# Patient Record
Sex: FEMALE | Race: WHITE | NOT HISPANIC OR LATINO | Employment: FULL TIME | URBAN - METROPOLITAN AREA
[De-identification: names, ages, dates, MRNs, and addresses within clinical notes are randomized per-mention and may not be internally consistent; named-entity substitution may affect disease eponyms.]

---

## 2019-08-08 ENCOUNTER — TELEPHONE (OUTPATIENT)
Dept: OBSTETRICS AND GYNECOLOGY | Age: 24
End: 2019-08-08

## 2019-08-22 ENCOUNTER — OFFICE VISIT (OUTPATIENT)
Dept: OBSTETRICS AND GYNECOLOGY | Age: 24
End: 2019-08-22

## 2019-08-22 VITALS
DIASTOLIC BLOOD PRESSURE: 78 MMHG | HEIGHT: 64 IN | SYSTOLIC BLOOD PRESSURE: 118 MMHG | BODY MASS INDEX: 23.93 KG/M2 | WEIGHT: 140.2 LBS

## 2019-08-22 DIAGNOSIS — Z97.5 NEXPLANON IN PLACE: ICD-10-CM

## 2019-08-22 DIAGNOSIS — Z12.4 SCREENING FOR MALIGNANT NEOPLASM OF CERVIX: Primary | ICD-10-CM

## 2019-08-22 PROCEDURE — 99385 PREV VISIT NEW AGE 18-39: CPT | Performed by: OBSTETRICS & GYNECOLOGY

## 2019-08-22 NOTE — PROGRESS NOTES
Routine Annual Visit    2019    Patient: Carmen Tejeda          MR#:[id number]      Chief Complaint   Patient presents with   • Gynecologic Exam     New pt. annual. has never had a pap smear done        History of Present Illness    23 y.o. female  who presents for annual exam and to discuss birth control    She [facility] and one replaced, she had instant nerve pain with the Nexplanon, it goes down her medial arm and into her pinky and ring finger.  She still occasionally has this pain if the area of the Nexplanon is manipulated in any way but has gotten better over time.  She also has persistent abnormal brown spotting that is annoying to her.  Is considering getting the Nexplanon out for this reason only, but is concerned about this nerve pain too. Also refuses attempt at nexplanon removal in the office and will request sedation.    Exercises  Sexually active w BF and declines STD screening today  No issues w intercourse  Declines STI testing    Health Maintenance  Gardasil pretty sure she got Gardasil  Last pap none yet  Mammogram NA  Colonoscopy NA  PCP none    No LMP recorded. Patient has had an implant.  Obstetric History:  OB History      Para Term  AB Living    0 0 0 0 0 0    SAB TAB Ectopic Molar Multiple Live Births    0 0 0 0 0 0         Menstrual History:     No LMP recorded. Patient has had an implant.     ________________________________________  There is no problem list on file for this patient.      History reviewed. No pertinent past medical history.    Family History   Problem Relation Age of Onset   • Lupus Mother    • Breast cancer Maternal Grandmother    • Ovarian cancer Maternal Great-Grandmother        Past Surgical History:   Procedure Laterality Date   • EAR TUBES      when she was a baby    • WISDOM TOOTH EXTRACTION         Social History     Tobacco Use   Smoking Status Never Smoker   Smokeless Tobacco Never  "Used       currently has no medications in their medication list.  ________________________________________    Current contraception: nexplanon  History of abnormal Pap smear: no  Family history of Breast, ovarian, uterine, colon, pancreatic cancer: GMA w breast and possible great GMA with ovarian ca  History of abnormal mammogram: NA    The following portions of the patient's history were reviewed and updated as appropriate: allergies, current medications, past family history, past medical history, past social history, past surgical history and problem list.    Review of Systems-complains of review of systems negative except for the nerve pain in her hand and abnormal bleeding    Objective   Physical Exam    /78   Ht 162.6 cm (64\")   Wt 63.6 kg (140 lb 3.2 oz)   Breastfeeding? No Comment: nexplanon   BMI 24.07 kg/m²    BP Readings from Last 3 Encounters:   08/22/19 118/78      Wt Readings from Last 3 Encounters:   08/22/19 63.6 kg (140 lb 3.2 oz)         BMI: Body mass index is 24.07 kg/m².        General:   alert, appears stated age and cooperative    Heart:: regular rate and rhythm, S1, S2 normal, no murmur, click, rub or gallop    Lungs: normal respiratory effort and auscultation    Left arm nexplanon palpable but a little deeply placed in the sulcus groove. When moved right and left there is palpable click with reproduction of tingling in her fourth and fifth digits    Abdomen: soft, non-tender, without masses or organomegaly    Breast: inspection negative, no nipple discharge or bleeding, no masses or nodularity palpable    Urethra and bladder: urethral meatus normal; bladder nontender to palpation;    Vulva: normal, Bartholin's, Urethra, [place]'s normal    Vagina: normal mucosa, normal discharge    Cervix: no lesions and nulliparous appearance    Uterus: normal size or anteverted    Adnexa: normal adnexa and no mass, fullness, tenderness       Assessment:    abnormal annual exam - nexplanon " issue    Plan:    Plan     []  Mammogram request made  [x]  PAP done  []  Labs:   []  GC/Chl/TV  []  DEXA scan   []  Referral for colonoscopy:     Problems Addressed this Visit        Other    Nexplanon in place    Screening for malignant neoplasm of cervix - Primary    Relevant Orders    IGP, Rfx Aptima HPV ASCU        Nexplanon in left arm associated with nerve complaints in the ulnar distribution.  Discussed that for now it is providing her with contraception, will contact  representatives regarding recommendations for removal versus if can continue with the device.  She would desire removal due to her abnormal bleeding.  Discussed other forms of birth control and we most interested in going back on low Loestrin.    Counseling  [x]  Nutrition  [x]  Physical activity/regular exercise   [x]  Healthy weight  []  Injury prevention  []  Smoking cessation  []  Substance misuse/abuse  [x]  Sexual behavior  [x]  STD prevention  [x]  Contraception  []  Dental health  []  Mental health  []  Immunization  [x]  Encouraged KARTHIKEYAN Reyes MD  08/22/2019  12:54 PM

## 2019-08-26 DIAGNOSIS — S54.02XA: Primary | ICD-10-CM

## 2019-08-26 LAB
CONV .: NORMAL
CYTOLOGIST CVX/VAG CYTO: NORMAL
CYTOLOGY CVX/VAG DOC CYTO: NORMAL
CYTOLOGY CVX/VAG DOC THIN PREP: NORMAL
DX ICD CODE: NORMAL
HIV 1 & 2 AB SER-IMP: NORMAL
OTHER STN SPEC: NORMAL
STAT OF ADQ CVX/VAG CYTO-IMP: NORMAL

## 2019-08-26 NOTE — PROGRESS NOTES
Called and discussed the ulnar nerve issue with the deep nexplanon. Discussed with surgeon colleagues and have recommendation for her to see plastics/hand surgeon for removal.  Referral placed to Dr. Hinojosa.  Also she desires to use patch after nexplanon removed, sent to pharmacy and to start 1 week prior to removal.  Follow up after nexplanon removal, does not necessary need the currently scheduled appt in sept.    Rachel Reyes MD  8/26/2019  2:34 PM

## 2019-08-27 ENCOUNTER — TELEPHONE (OUTPATIENT)
Dept: OBSTETRICS AND GYNECOLOGY | Age: 24
End: 2019-08-27

## 2019-08-27 NOTE — TELEPHONE ENCOUNTER
----- Message from Rachel Reyes MD sent at 8/27/2019  8:50 AM EDT -----  Please let her know that her Pap was normal    ----- Message -----  From: Interface, Reflab Results In  Sent: 8/26/2019   4:35 PM  To: Rachel Reyes MD

## 2019-09-19 ENCOUNTER — TELEPHONE (OUTPATIENT)
Dept: OBSTETRICS AND GYNECOLOGY | Age: 24
End: 2019-09-19

## 2019-09-19 NOTE — TELEPHONE ENCOUNTER
Called pt as she had cancelled appt today in follow up of her nexplanon issue.   She saw plastic surgery and they do not believe that her ulnar nerve issue is due to the nexplanon but rather due to different anatomy at her elbow. They did imaging at it does not appear to be involving the ulnar nerve. They recommended leaving it in place and removal at the usually indicated time. Will request records from her visit.    To follow up for annual exam next year or sooner if having any issues.    Rachel Reyes MD  9/19/2019  9:15 AM

## 2021-09-27 ENCOUNTER — TELEPHONE (OUTPATIENT)
Dept: OBSTETRICS AND GYNECOLOGY | Age: 26
End: 2021-09-27

## 2021-09-29 ENCOUNTER — TELEPHONE (OUTPATIENT)
Dept: OBSTETRICS AND GYNECOLOGY | Age: 26
End: 2021-09-29

## 2021-09-29 NOTE — TELEPHONE ENCOUNTER
Attempted to call patient, we need to examine this Nexplanon again.  If it is truly a deep placement then we might consider it but otherwise it truly needs to be removed in the office.    Bottom line, needs an appointment to discuss this further.  Can use new OB slot next week

## 2021-09-29 NOTE — TELEPHONE ENCOUNTER
Pt called wanting nexplanon removed. Pt called two days ago, per last telephone encounter, she does not want it removed in the office. Please advise.

## 2021-10-06 ENCOUNTER — OFFICE VISIT (OUTPATIENT)
Dept: OBSTETRICS AND GYNECOLOGY | Age: 26
End: 2021-10-06

## 2021-10-06 VITALS
HEIGHT: 64 IN | SYSTOLIC BLOOD PRESSURE: 116 MMHG | DIASTOLIC BLOOD PRESSURE: 68 MMHG | WEIGHT: 128.8 LBS | BODY MASS INDEX: 21.99 KG/M2

## 2021-10-06 DIAGNOSIS — F41.9 ANXIETY DUE TO INVASIVE PROCEDURE: Primary | ICD-10-CM

## 2021-10-06 PROCEDURE — 99214 OFFICE O/P EST MOD 30 MIN: CPT | Performed by: OBSTETRICS & GYNECOLOGY

## 2021-10-06 RX ORDER — LEVONORGESTREL AND ETHINYL ESTRADIOL 0.1-0.02MG
1 KIT ORAL DAILY
Qty: 84 TABLET | Refills: 3 | Status: SHIPPED | OUTPATIENT
Start: 2021-10-06 | End: 2022-09-02

## 2021-10-06 RX ORDER — ALPRAZOLAM 1 MG/1
1 TABLET ORAL ONCE
Qty: 1 TABLET | Refills: 0 | Status: SHIPPED | OUTPATIENT
Start: 2021-10-06 | End: 2021-10-06

## 2021-10-06 NOTE — PROGRESS NOTES
"Chief Complaint   Patient presents with   • Gynecologic Exam     Discuss Nexplanon removal      Carmen Tejeda presents to discuss Nexplanon removal.  She had some issues with the last one, feels like a nerve might of been injured.  She saw hand surgeon and they did not feel as though this was the case.  She seems to have some slightly aberrant nerve anatomy in her arm that has led to her tingling and numbness in her fourth and fifth fingers occasionally.  She is very anxious about having the Nexplanon removed    /68   Ht 162.6 cm (64\")   Wt 58.4 kg (128 lb 12.8 oz)   Breastfeeding No   BMI 22.11 kg/m²   Appears well and in no distress  Nexplanon present in left upper arm, and is quite superficial    A/P  Nexplanon expiring  She was interested in sedation for Nexplanon removal, but it is quite superficial and I recommended being awake during this procedure.  Plan to give her an anxiolytic beforehand.    Follow-up for Nexplanon removal, desires to start OCPs and recommended starting a week prior to removal. Sent to pharmacy    Rachel Reyes MD  10/6/2021   15:00 EDT    "

## 2021-10-11 ENCOUNTER — TELEPHONE (OUTPATIENT)
Dept: OBSTETRICS AND GYNECOLOGY | Age: 26
End: 2021-10-11

## 2021-10-11 NOTE — TELEPHONE ENCOUNTER
PT calls stating she started her cycle today and asking if she should still start her BCP she is to take prior to her nexplanon insertion. Please advise

## 2021-10-12 NOTE — TELEPHONE ENCOUNTER
Pt started a menses yesterday. She was told by Dr Reyes to start the pills on 10-13, but now that she has started a menses, when should she start the pills. Should she start on the 13th still or when this period ends?

## 2021-10-20 ENCOUNTER — OFFICE VISIT (OUTPATIENT)
Dept: OBSTETRICS AND GYNECOLOGY | Age: 26
End: 2021-10-20

## 2021-10-20 VITALS
WEIGHT: 125.8 LBS | DIASTOLIC BLOOD PRESSURE: 70 MMHG | BODY MASS INDEX: 21.48 KG/M2 | HEIGHT: 64 IN | SYSTOLIC BLOOD PRESSURE: 110 MMHG

## 2021-10-20 DIAGNOSIS — Z30.46 NEXPLANON REMOVAL: Primary | ICD-10-CM

## 2021-10-20 PROCEDURE — 11982 REMOVE DRUG IMPLANT DEVICE: CPT | Performed by: OBSTETRICS & GYNECOLOGY

## 2021-10-20 RX ORDER — ALPRAZOLAM 1 MG/1
TABLET ORAL
COMMUNITY
Start: 2021-10-12 | End: 2022-09-14

## 2021-10-20 NOTE — PROGRESS NOTES
PROCEDURE NOTE   Nexplanon Removal    Applying Universal Protocol I properly identified the patient and sought her signature with informed consent. Plans for future Contraception were discussed .  The patient chose to use birth control pills after nexplanon removal.    The area was prepped with Betadine,  3 cc's of  local 1% xylocaine with epinephrine was injected sub-cutaneous with a 25 ga needle.  After sufficient time for the anesthetic to work a  #11 SURGICAL knife was used to make a 4 mm incision over the underlying device. Using a hemostat  The device was  successfully removed.  Closure was completed by gauze and stretchwrap.  Instructions for wound care and follow up were discussed.    She tolerated the procedure well. Needs to follow up for annual exam soon.    Rachel Reyes MD  10/20/2021  13:53 EDT

## 2022-09-02 RX ORDER — LEVONORGESTREL AND ETHINYL ESTRADIOL 0.1-0.02MG
KIT ORAL
Qty: 84 TABLET | Refills: 3 | Status: SHIPPED | OUTPATIENT
Start: 2022-09-02 | End: 2022-09-14

## 2022-09-14 ENCOUNTER — OFFICE VISIT (OUTPATIENT)
Dept: OBSTETRICS AND GYNECOLOGY | Age: 27
End: 2022-09-14

## 2022-09-14 VITALS
HEIGHT: 64 IN | WEIGHT: 135 LBS | SYSTOLIC BLOOD PRESSURE: 100 MMHG | BODY MASS INDEX: 23.05 KG/M2 | DIASTOLIC BLOOD PRESSURE: 60 MMHG

## 2022-09-14 DIAGNOSIS — Z12.4 SCREENING FOR CERVICAL CANCER: ICD-10-CM

## 2022-09-14 DIAGNOSIS — Z11.3 SCREEN FOR STD (SEXUALLY TRANSMITTED DISEASE): ICD-10-CM

## 2022-09-14 DIAGNOSIS — Z01.419 WELL WOMAN EXAM WITH ROUTINE GYNECOLOGICAL EXAM: Primary | ICD-10-CM

## 2022-09-14 PROCEDURE — 99395 PREV VISIT EST AGE 18-39: CPT | Performed by: NURSE PRACTITIONER

## 2022-09-14 NOTE — PROGRESS NOTES
Subjective     Chief Complaint   Patient presents with   • Gynecologic Exam     ae, LAST PAP 2019 NEG       History of Present Illness    Carmen Han is a 26 y.o.  who presents for annual exam.    Doing well  Works in real estate  Due for a pap smear  Periods are irregular   She stopped OCP's two weeks ago  She is ok if pregnancy happens but not trying  No other GYN concerns or complaints    Obstetric History:  OB History        0    Para   0    Term   0       0    AB   0    Living   0       SAB   0    IAB   0    Ectopic   0    Molar   0    Multiple   0    Live Births   0               Menstrual History:     Patient's last menstrual period was 2022.         Current contraception: none  History of abnormal Pap smear: no  Received Gardasil immunization: no  Perform regular self breast exam: no   Family history of uterine or ovarian cancer: no  Family History of colon cancer: no  Family history of breast cancer: yes - MGM    Mammogram: not indicated.  Colonoscopy: not indicated.  DEXA: not indicated.    Exercise: moderately active  Calcium/Vitamin D: adequate intake    The following portions of the patient's history were reviewed and updated as appropriate: allergies, current medications, past family history, past medical history, past social history, past surgical history and problem list.    Review of Systems   Constitutional: Negative.    Respiratory: Negative.    Cardiovascular: Negative.    Gastrointestinal: Negative.    Genitourinary: Negative.    Skin: Negative.    Psychiatric/Behavioral: Negative.            Objective   Physical Exam  Constitutional:       General: She is awake.      Appearance: Normal appearance. She is well-developed.   HENT:      Head: Normocephalic and atraumatic.      Nose: Nose normal.   Neck:      Thyroid: No thyroid mass, thyromegaly or thyroid tenderness.   Cardiovascular:      Rate and Rhythm: Normal rate and regular rhythm.      Pulses: Normal  pulses.      Heart sounds: Normal heart sounds.   Pulmonary:      Effort: Pulmonary effort is normal.      Breath sounds: Normal breath sounds.   Chest:   Breasts: Breasts are symmetrical.      Right: Normal. No swelling, bleeding, inverted nipple, mass, nipple discharge, skin change, tenderness or supraclavicular adenopathy.      Left: Normal. No swelling, bleeding, inverted nipple, mass, nipple discharge, skin change, tenderness or supraclavicular adenopathy.       Abdominal:      General: Abdomen is flat. Bowel sounds are normal.      Palpations: Abdomen is soft.      Tenderness: There is no abdominal tenderness.   Genitourinary:     General: Normal vulva.      Labia:         Right: No rash, tenderness, lesion or injury.         Left: No rash, tenderness, lesion or injury.       Urethra: No prolapse, urethral pain, urethral swelling or urethral lesion.      Vagina: Normal. No signs of injury. No vaginal discharge, erythema, tenderness, bleeding, lesions or prolapsed vaginal walls.      Cervix: No discharge, friability, lesion, erythema or cervical bleeding.      Uterus: Normal. Not enlarged, not tender and no uterine prolapse.       Adnexa: Right adnexa normal and left adnexa normal.        Right: No mass, tenderness or fullness.          Left: No mass, tenderness or fullness.        Rectum: Normal. No mass.   Musculoskeletal:      Cervical back: Normal range of motion and neck supple.   Lymphadenopathy:      Upper Body:      Right upper body: No supraclavicular adenopathy.      Left upper body: No supraclavicular adenopathy.   Skin:     General: Skin is warm and dry.   Neurological:      General: No focal deficit present.      Mental Status: She is alert and oriented to person, place, and time.   Psychiatric:         Mood and Affect: Mood normal.         Behavior: Behavior normal. Behavior is cooperative.         Thought Content: Thought content normal.         Judgment: Judgment normal.         /60   Ht  "162.6 cm (64\")   Wt 61.2 kg (135 lb)   LMP 08/31/2022   BMI 23.17 kg/m²     Assessment & Plan   Diagnoses and all orders for this visit:    1. Well woman exam with routine gynecological exam (Primary)    2. Screening for cervical cancer  -     IGP,CtNgTv,rfx Aptima HPV ASCU    3. Screen for STD (sexually transmitted disease)  -     IGP,CtNgTv,rfx Aptima HPV ASCU        All questions answered.  Breast self exam technique reviewed and patient encouraged to perform self-exam monthly.  Discussed healthy lifestyle modifications.  Recommended 30 minutes of aerobic exercise five times per week.  Discussed calcium needs to prevent osteoporosis.    F/u 1 year  Start daily PNV               "

## 2022-09-21 LAB
C TRACH RRNA CVX QL NAA+PROBE: NEGATIVE
CONV .: NORMAL
CYTOLOGIST CVX/VAG CYTO: NORMAL
CYTOLOGY CVX/VAG DOC CYTO: NORMAL
CYTOLOGY CVX/VAG DOC THIN PREP: NORMAL
DX ICD CODE: NORMAL
HIV 1 & 2 AB SER-IMP: NORMAL
Lab: NORMAL
N GONORRHOEA RRNA CVX QL NAA+PROBE: NEGATIVE
OTHER STN SPEC: NORMAL
STAT OF ADQ CVX/VAG CYTO-IMP: NORMAL
T VAGINALIS RRNA SPEC QL NAA+PROBE: NEGATIVE

## 2023-02-14 ENCOUNTER — TELEPHONE (OUTPATIENT)
Dept: OBSTETRICS AND GYNECOLOGY | Age: 28
End: 2023-02-14
Payer: COMMERCIAL

## 2023-02-14 NOTE — TELEPHONE ENCOUNTER
Pt's LMP 12-, Pt is c/o nausea and vomiting and requesting meds be sent to pharmacy. Pt has NOB appt 2-.

## 2023-02-21 ENCOUNTER — OFFICE VISIT (OUTPATIENT)
Dept: OBSTETRICS AND GYNECOLOGY | Age: 28
End: 2023-02-21
Payer: COMMERCIAL

## 2023-02-21 VITALS
SYSTOLIC BLOOD PRESSURE: 112 MMHG | BODY MASS INDEX: 21.51 KG/M2 | WEIGHT: 126 LBS | HEIGHT: 64 IN | DIASTOLIC BLOOD PRESSURE: 64 MMHG

## 2023-02-21 DIAGNOSIS — Z34.90 EARLY STAGE OF PREGNANCY: Primary | ICD-10-CM

## 2023-02-21 PROCEDURE — 99213 OFFICE O/P EST LOW 20 MIN: CPT | Performed by: PHYSICIAN ASSISTANT

## 2023-02-21 RX ORDER — DOXYLAMINE SUCCINATE AND PYRIDOXINE HYDROCHLORIDE 20; 20 MG/1; MG/1
TABLET, EXTENDED RELEASE ORAL
COMMUNITY
End: 2023-02-21 | Stop reason: SDUPTHER

## 2023-02-21 RX ORDER — DOXYLAMINE SUCCINATE AND PYRIDOXINE HYDROCHLORIDE 20; 20 MG/1; MG/1
1 TABLET, EXTENDED RELEASE ORAL 2 TIMES DAILY
Qty: 60 TABLET | Refills: 2 | Status: SHIPPED | OUTPATIENT
Start: 2023-02-21

## 2023-02-21 RX ORDER — LANOLIN ALCOHOL/MO/W.PET/CERES
50 CREAM (GRAM) TOPICAL DAILY
COMMUNITY

## 2023-02-21 NOTE — PROGRESS NOTES
"Subjective     Chief Complaint   Patient presents with   • Possible Pregnancy     pregnancy confirmation LMP 2022, ultrasound done today. C/o a lot of nausea and vomiting.  Occ cramping       Carmen SCOTT is a 27 y.o.  whose LMP is Patient's last menstrual period was 2022 (exact date). presents to establish for new ob    She is newly pregnant  First pregnancy for her and her   Has been dealing with nausea and vomiting  Is on bonjesta now and doing better  I do see a weight loss of 9 lbs since her last visit in September and she does attribute this to the N/V  Is now able to eat and drink    She is taking a PNV    Had an ultrasound today    Pt of Dr Eric logan on her exams    No Additional Complaints Reported    The following portions of the patient's history were reviewed and updated as appropriate:vital signs, allergies, current medications, past social history, past surgical history and problem list      Review of Systems   Genitourinary:positive for early stage of pregnancy   Objective      /64   Ht 162.6 cm (64\")   Wt 57.2 kg (126 lb)   LMP 2022 (Exact Date)   BMI 21.63 kg/m²     Physical Exam    General:   alert, comfortable and no distress   Heart: Not performed today   Lungs: Not performed today.   Breast: Not performed today   Neck: Not performed today   Abdomen: Not performed today   CVA: Not performed today   Pelvis: Not performed today   Extremities: Not performed today   Neurologic: negative   Psychiatric: Normal affect, judgement, and mood       Lab Review   Labs: No data reviewed    Imaging   Ultrasound - Pelvic Vaginal    Intrauterine pregnancy at Unknown  Fetal heart rate: 178  Gestational age for synopsis: 7 wks  Basis for EDC: Ultrasound   Relevant comparison data: No relevant comparison data  ELLY based off u/s is 10/2/2023    Assessment & Plan     ASSESSMENT  1. Early stage of pregnancy          PLAN  1.   Orders Placed This Encounter   Procedures "   • Urine Culture - Urine, Urine, Random Void   • HIV-1 / O / 2 Ag / Antibody 4th Generation   • Hepatitis B Surface Antigen   • Hepatitis C Antibody   • Rubella Antibody, IgG   • RPR, Rfx Qn RPR / Confirm TP   • ABO / Rh   • Antibody Screen   • CBC & Differential       2. Medications prescribed this encounter:        New Medications Ordered This Visit   Medications   • Doxylamine-Pyridoxine ER (Bonjesta) 20-20 MG tablet controlled-release     Sig: Take 1 tablet by mouth 2 (Two) Times a Day.     Dispense:  60 tablet     Refill:  2       3. C/w PNV and bonjesta prn n/v. Labs and urine ordered today. Reviewed new ob folder with pt. HO given on NIPS and carrier testing. Call for any issues, otherwise, f/u for new ob visit in 3 wks    Follow up: 3 week(s)    LELE Velazquez  2/21/2023

## 2023-02-23 LAB
ABO GROUP BLD: NORMAL
BACTERIA UR CULT: NO GROWTH
BACTERIA UR CULT: NORMAL
BASOPHILS # BLD AUTO: 0.1 X10E3/UL (ref 0–0.2)
BASOPHILS NFR BLD AUTO: 1 %
BLD GP AB SCN SERPL QL: NEGATIVE
EOSINOPHIL # BLD AUTO: 0 X10E3/UL (ref 0–0.4)
EOSINOPHIL NFR BLD AUTO: 1 %
ERYTHROCYTE [DISTWIDTH] IN BLOOD BY AUTOMATED COUNT: 12 % (ref 11.7–15.4)
HBV SURFACE AG SERPL QL IA: NEGATIVE
HCT VFR BLD AUTO: 35.5 % (ref 34–46.6)
HCV IGG SERPL QL IA: NON REACTIVE
HGB BLD-MCNC: 12.2 G/DL (ref 11.1–15.9)
HIV 1+2 AB+HIV1 P24 AG SERPL QL IA: NON REACTIVE
IMM GRANULOCYTES # BLD AUTO: 0 X10E3/UL (ref 0–0.1)
IMM GRANULOCYTES NFR BLD AUTO: 0 %
LYMPHOCYTES # BLD AUTO: 1.1 X10E3/UL (ref 0.7–3.1)
LYMPHOCYTES NFR BLD AUTO: 14 %
MCH RBC QN AUTO: 31.1 PG (ref 26.6–33)
MCHC RBC AUTO-ENTMCNC: 34.4 G/DL (ref 31.5–35.7)
MCV RBC AUTO: 91 FL (ref 79–97)
MONOCYTES # BLD AUTO: 0.4 X10E3/UL (ref 0.1–0.9)
MONOCYTES NFR BLD AUTO: 5 %
NEUTROPHILS # BLD AUTO: 6.4 X10E3/UL (ref 1.4–7)
NEUTROPHILS NFR BLD AUTO: 79 %
PLATELET # BLD AUTO: 237 X10E3/UL (ref 150–450)
RBC # BLD AUTO: 3.92 X10E6/UL (ref 3.77–5.28)
RH BLD: NEGATIVE
RPR SER QL: NON REACTIVE
RUBV IGG SERPL IA-ACNC: 1.46 INDEX
WBC # BLD AUTO: 8.1 X10E3/UL (ref 3.4–10.8)

## 2023-03-15 ENCOUNTER — INITIAL PRENATAL (OUTPATIENT)
Dept: OBSTETRICS AND GYNECOLOGY | Age: 28
End: 2023-03-15
Payer: COMMERCIAL

## 2023-03-15 VITALS — DIASTOLIC BLOOD PRESSURE: 58 MMHG | BODY MASS INDEX: 21.49 KG/M2 | SYSTOLIC BLOOD PRESSURE: 104 MMHG | WEIGHT: 125.2 LBS

## 2023-03-15 DIAGNOSIS — Z13.89 SCREENING FOR BLOOD OR PROTEIN IN URINE: ICD-10-CM

## 2023-03-15 DIAGNOSIS — Z34.81 PRENATAL CARE, SUBSEQUENT PREGNANCY, FIRST TRIMESTER: ICD-10-CM

## 2023-03-15 DIAGNOSIS — Z31.430 ENCOUNTER OF FEMALE FOR TESTING FOR GENETIC DISEASE CARRIER STATUS FOR PROCREATIVE MANAGEMENT: Primary | ICD-10-CM

## 2023-03-15 DIAGNOSIS — O21.9 NAUSEA/VOMITING IN PREGNANCY: ICD-10-CM

## 2023-03-15 PROBLEM — Z97.5 NEXPLANON IN PLACE: Status: RESOLVED | Noted: 2019-08-22 | Resolved: 2023-03-15

## 2023-03-15 PROBLEM — Z34.00 SUPERVISION OF NORMAL FIRST PREGNANCY, ANTEPARTUM: Status: ACTIVE | Noted: 2023-03-15

## 2023-03-15 LAB
GLUCOSE UR STRIP-MCNC: NEGATIVE MG/DL
PROT UR STRIP-MCNC: NEGATIVE MG/DL

## 2023-03-15 PROCEDURE — 0502F SUBSEQUENT PRENATAL CARE: CPT | Performed by: OBSTETRICS & GYNECOLOGY

## 2023-03-15 RX ORDER — ONDANSETRON 4 MG/1
4 TABLET, ORALLY DISINTEGRATING ORAL EVERY 6 HOURS PRN
Qty: 40 TABLET | Refills: 1 | Status: SHIPPED | OUTPATIENT
Start: 2023-03-15

## 2023-03-15 NOTE — PROGRESS NOTES
Chief Complaint   Patient presents with   • Routine Prenatal Visit     10w4d OB Check      Carmen SCOTT still has nausea and vomiting, feels like she needs something else to help with nausea    Declines flu shot  Genetics today    Rachel Reyes MD  3/15/2023  10:21 EDT

## 2023-03-28 LAB
Lab: NEGATIVE
Lab: NORMAL
NTRA ALPHA-THALASSEMIA: NEGATIVE
NTRA BETA-HEMOGLOBINOPATHIES: NEGATIVE
NTRA CANAVAN DISEASE: NEGATIVE
NTRA CYSTIC FIBROSIS: NEGATIVE
NTRA DUCHENNE/BECKER MUSCULAR DYSTROPHY: NEGATIVE
NTRA FAMILIAL DYSAUTONOMIA: NEGATIVE
NTRA FRAGILE X SYNDROME: NEGATIVE
NTRA GALACTOSEMIA: NEGATIVE
NTRA GAUCHER DISEASE: NEGATIVE
NTRA MEDIUM CHAIN ACYL-COA DEHYDROGENASE DEFICIENCY: NEGATIVE
NTRA POLYCYSTIC KIDNEY DISEASE, AUTOSOMAL RECESSIVE: NEGATIVE
NTRA SMITH-LEMLI-OPITZ SYNDROME: NEGATIVE
NTRA SPINAL MUSCULAR ATROPHY: NEGATIVE
NTRA TAY-SACHS DISEASE: NEGATIVE

## 2023-03-28 NOTE — PROGRESS NOTES
Please notify that her carrier screening was negative for common genetic problems that she may silently carry and pass onto her pregnancy    Rachel Reyes MD  3/28/2023  13:51 EDT

## 2023-04-10 ENCOUNTER — TELEPHONE (OUTPATIENT)
Dept: OBSTETRICS AND GYNECOLOGY | Age: 28
End: 2023-04-10
Payer: COMMERCIAL

## 2023-04-10 NOTE — TELEPHONE ENCOUNTER
Dr. Reyes pt.    Pt called stating her next appt is on 04/17/2023 but she thought it was scheduled for 04/19/2023.  Pt works on Mondays and Thursdays and it will be difficult for her to come in on these days.  She would like to move her appt to 04/19/2023 if possible.  Best time would be around 10:30 or 11:30.  Please advise.  Thank you.

## 2023-04-19 ENCOUNTER — ROUTINE PRENATAL (OUTPATIENT)
Dept: OBSTETRICS AND GYNECOLOGY | Age: 28
End: 2023-04-19
Payer: COMMERCIAL

## 2023-04-19 VITALS — DIASTOLIC BLOOD PRESSURE: 60 MMHG | BODY MASS INDEX: 22.21 KG/M2 | SYSTOLIC BLOOD PRESSURE: 104 MMHG | WEIGHT: 129.4 LBS

## 2023-04-19 DIAGNOSIS — Z13.89 SCREENING FOR BLOOD OR PROTEIN IN URINE: Primary | ICD-10-CM

## 2023-04-19 DIAGNOSIS — Z34.81 PRENATAL CARE, SUBSEQUENT PREGNANCY, FIRST TRIMESTER: ICD-10-CM

## 2023-04-19 LAB
GLUCOSE UR STRIP-MCNC: NEGATIVE MG/DL
PROT UR STRIP-MCNC: NEGATIVE MG/DL

## 2023-04-19 PROCEDURE — 81002 URINALYSIS NONAUTO W/O SCOPE: CPT | Performed by: OBSTETRICS & GYNECOLOGY

## 2023-04-19 PROCEDURE — 0502F SUBSEQUENT PRENATAL CARE: CPT | Performed by: OBSTETRICS & GYNECOLOGY

## 2023-04-19 NOTE — PROGRESS NOTES
Chief Complaint   Patient presents with   • Routine Prenatal Visit     15wd4 OB Check      Carmen Tejeda is feeling better, her fatigue has mostly resolved and she is eating well.  She does complains of some constipation, goes only once or twice a week and it is large and difficult to pass.  Advised to start taking a stool softener twice a day along with MiraLAX at least daily  Fetal heart rate around 140 on Doppler today    Reviewed anatomy scan next visit in 4 weeks    Rachel Reyes MD  4/19/2023  11:21 EDT

## 2023-05-09 ENCOUNTER — TELEPHONE (OUTPATIENT)
Dept: OBSTETRICS AND GYNECOLOGY | Age: 28
End: 2023-05-09
Payer: COMMERCIAL

## 2023-05-09 NOTE — TELEPHONE ENCOUNTER
Pt returning call, Notified and pt agrees to observe for now. But agrees to go to L&D if continued bleeding.

## 2023-05-17 ENCOUNTER — ROUTINE PRENATAL (OUTPATIENT)
Dept: OBSTETRICS AND GYNECOLOGY | Age: 28
End: 2023-05-17
Payer: COMMERCIAL

## 2023-05-17 VITALS — DIASTOLIC BLOOD PRESSURE: 60 MMHG | SYSTOLIC BLOOD PRESSURE: 104 MMHG | BODY MASS INDEX: 23.34 KG/M2 | WEIGHT: 136 LBS

## 2023-05-17 DIAGNOSIS — Z34.02 ENCOUNTER FOR SUPERVISION OF NORMAL FIRST PREGNANCY IN SECOND TRIMESTER: ICD-10-CM

## 2023-05-17 DIAGNOSIS — Z13.89 SCREENING FOR BLOOD OR PROTEIN IN URINE: Primary | ICD-10-CM

## 2023-05-17 LAB
GLUCOSE UR STRIP-MCNC: NEGATIVE MG/DL
PROT UR STRIP-MCNC: NEGATIVE MG/DL

## 2023-05-17 NOTE — PROGRESS NOTES
Chief Complaint   Patient presents with   • Routine Prenatal Visit     Ob Check & Anatomy US - Pt is 19w4d, Pt has no complaints today     Carmen Tejeda is doing well, no complaints  Normal anatomy but incomplete, needs follow-up for heart and renal artery review  Declines AFP screen today    Follow-up in 4 weeks with repeat anatomy scan    Rachel Reyes MD  5/17/2023  13:09 EDT

## 2023-05-18 ENCOUNTER — TELEPHONE (OUTPATIENT)
Dept: OBSTETRICS AND GYNECOLOGY | Age: 28
End: 2023-05-18
Payer: COMMERCIAL

## 2023-05-18 NOTE — TELEPHONE ENCOUNTER
Called pt back, unable to reach and left message  Sounds as though she had a vasovagal episode. Is common issue during second trimester. Need to stay well hydrated, if feeling dizzy lay on left side. If this is a recurrent issue then needs workup    Rachel Reyes MD  5/18/2023  18:09 EDT

## 2023-05-18 NOTE — TELEPHONE ENCOUNTER
19w5d pt called stating that she was getting her nails done earlier and about 5 minutes after she sat down after washing her hands, she got really weak, was hot/sweaty, the room was spinning, the  told her she was really pale, and her vision slowly went black - lasting for about 5 minutes. She said she ate ramen, dumplings, and a cookie about an hour before this happened. Please advise.

## 2023-06-14 ENCOUNTER — ROUTINE PRENATAL (OUTPATIENT)
Dept: OBSTETRICS AND GYNECOLOGY | Age: 28
End: 2023-06-14
Payer: COMMERCIAL

## 2023-06-14 VITALS — DIASTOLIC BLOOD PRESSURE: 62 MMHG | WEIGHT: 141.6 LBS | BODY MASS INDEX: 24.31 KG/M2 | SYSTOLIC BLOOD PRESSURE: 108 MMHG

## 2023-06-14 DIAGNOSIS — Z34.00 SUPERVISION OF NORMAL FIRST PREGNANCY, ANTEPARTUM: ICD-10-CM

## 2023-06-14 DIAGNOSIS — Z3A.23 23 WEEKS GESTATION OF PREGNANCY: Primary | ICD-10-CM

## 2023-06-14 LAB
GLUCOSE UR STRIP-MCNC: NEGATIVE MG/DL
PROT UR STRIP-MCNC: NEGATIVE MG/DL

## 2023-06-14 NOTE — PROGRESS NOTES
Chief Complaint   Patient presents with   • Routine Prenatal Visit     OB Check, Pt is 23w4d, Repeat Anatomy today, Pt has no complaints today      Carmen Tejeda is doing well, no complaints. Normal fetal movement  Anatomy normal and complete, normal growth  Reviewed labor etc classes  GCT next visit    Rachel Reyes MD  6/14/2023  12:41 EDT

## 2023-11-08 NOTE — TELEPHONE ENCOUNTER
If it is just a small amount and she is not continuing to bleed at all, I would just observe.  If she does continue to have bleeding then I would recommend going to labor and delivery for evaluation.  Especially if she had been sexually active is very common to have a small spot of bleeding after.    Rachel Reyes MD  5/9/2023  15:38 EDT     Ms. Rubalcava called to report Mr. Garza blood sugar was 211 this morning